# Patient Record
Sex: MALE | HISPANIC OR LATINO | Employment: FULL TIME | URBAN - METROPOLITAN AREA
[De-identification: names, ages, dates, MRNs, and addresses within clinical notes are randomized per-mention and may not be internally consistent; named-entity substitution may affect disease eponyms.]

---

## 2017-07-08 ENCOUNTER — OFFICE VISIT (OUTPATIENT)
Dept: URGENT CARE | Facility: PHYSICIAN GROUP | Age: 27
End: 2017-07-08
Payer: COMMERCIAL

## 2017-07-08 VITALS
DIASTOLIC BLOOD PRESSURE: 82 MMHG | HEIGHT: 68 IN | OXYGEN SATURATION: 98 % | TEMPERATURE: 98.1 F | BODY MASS INDEX: 35.77 KG/M2 | SYSTOLIC BLOOD PRESSURE: 130 MMHG | WEIGHT: 236 LBS | HEART RATE: 104 BPM | RESPIRATION RATE: 16 BRPM

## 2017-07-08 DIAGNOSIS — J00 ACUTE NASOPHARYNGITIS: ICD-10-CM

## 2017-07-08 PROCEDURE — 99213 OFFICE O/P EST LOW 20 MIN: CPT | Performed by: PHYSICIAN ASSISTANT

## 2017-07-08 RX ORDER — FLUTICASONE PROPIONATE 50 MCG
1 SPRAY, SUSPENSION (ML) NASAL 2 TIMES DAILY
Qty: 16 G | Refills: 0 | Status: SHIPPED | OUTPATIENT
Start: 2017-07-08

## 2017-07-08 NOTE — Clinical Note
July 8, 2017         Patient: Lasha Clancy Jr.   YOB: 1990   Date of Visit: 7/8/2017           To Whom it May Concern:    Lasha Clancy was seen in my clinic on 7/8/2017. Please excuse from missed today.     If you have any questions or concerns, please don't hesitate to call.        Sincerely,           Sera Quintero PA-C  Electronically Signed

## 2017-07-08 NOTE — MR AVS SNAPSHOT
"        Lasha Clancy JrAz   2017 12:20 PM   Office Visit   MRN: [id number]    Department:  [facility]   Dept Phone:  [phone]    Description:  Male : 1990   Provider:  Sera Quintero PA-C           Reason for Visit     Sinus Problem sinus pressure, fever, dizziness, buring sensation in [id number]      Allergies as of 2017     Allergen Noted Reactions    Pineapple 2015         You were diagnosed with     Acute nasopharyngitis   [[id number]]         Vital Signs     Blood Pressure Pulse Temperature Respirations Height Weight    130/82 mmHg 104 36.7 °C (98.1 °F) 16 1.727 m (5' 7.99\") 107.049 kg (236 lb)    Body Mass Index Oxygen Saturation Smoking Status             35.89 kg/m2 98% Former Smoker         Basic Information     Date Of Birth Sex Race Ethnicity Preferred Language    1990 Male  or   Origin (Turkish,Scottish,Gambian,Namibian, etc) English      Your appointments     Aug 11, 2017 10:50 AM   ANNUAL EXAM PREVENTATIVE with Lelia Moss D.O.   [facility])    [address], [address]  [place] [address] [phone]              Problem List              ICD-10-CM Priority Class Noted - Resolved    History of suicide attempt Z91.5   10/27/2014 - Present    Obesity E66.9   10/27/2014 - Present      Health Maintenance        Date Due Completion Dates    IMM HEP B VACCINE (1 of 3 - Primary Series) 1992 ---    IMM HEP A VACCINE (1 of 2 - Standard Series) 1993 ---    IMM VARICELLA (CHICKENPOX) VACCINE (1 of 2 - 2 Dose Adolescent Series) 2005 ---    IMM HPV VACCINE (2 of 3 - Male 3 Dose Series) 2014 10/27/2014    IMM INFLUENZA (1) 2017 10/27/2014    IMM DTaP/Tdap/Td Vaccine (2 - Td) 10/27/2024 10/27/2014            Current Immunizations     HPV Quadrivalent Vaccine (GARDASIL) 10/27/2014    Influenza Vaccine Quad Inj (Pf) 10/27/2014    Tdap Vaccine 10/27/2014      Below and/or attached are " the medications your provider expects you to take. Review all of your home medications and newly ordered medications with your provider and/or pharmacist. Follow medication instructions as directed by your provider and/or pharmacist. Please keep your medication list with you and share with your provider. Update the information when medications are discontinued, doses are changed, or new medications (including over-the-counter products) are added; and carry medication information at all times in the event of emergency situations     Allergies:  PINEAPPLE - (reactions not documented)               Medications  Valid as of: July 08, 2017 -  1:24 PM    Generic Name Brand Name Tablet Size Instructions for use    Fluticasone Propionate (Suspension) FLONASE 50 MCG/ACT Spray 1 Spray in nose 2 times a day.        .                 Medicines prescribed today were sent to:     [id number] - [facility] [place] - [address]    [address] [address]    Phone: [phone] Fax: [phone]    Open 24 Hours?: No      Medication refill instructions:       If your prescription bottle indicates you have medication refills left, it is not necessary to call your provider’s office. Please contact your pharmacy and they will refill your medication.    If your prescription bottle indicates you do not have any refills left, you may request refills at any time through one of the following ways: The online [employer] system (except Urgent Care), by calling your provider’s office, or by asking your pharmacy to contact your provider’s office with a refill request. Medication refills are processed only during regular business hours and may not be available until the next business day. Your provider may request additional information or to have a follow-up visit with you prior to refilling your medication.   *Please Note: Medication refills are assigned a new Rx number when refilled electronically. Your pharmacy may  indicate that no refills were authorized even though a new prescription for the same medication is available at the pharmacy. Please request the medicine by name with the pharmacy before contacting your provider for a refill.           [email or web address] Access Code: [email or web address]  Expires: 8/7/2017 12:20 PM    [email or web address]  A secure, online tool to manage your health information     [employer]’s [email or web address]com® is a secure, online tool that connects you to your personalized health information from the privacy of your home -- day or night - making it very easy for you to manage your healthcare. Once the activation process is completed, you can even access your medical information using the IntelligentE[employer]com dylan, which is available for free in the Apple Dylan store or Google Play store.     [email or web address]com provides the following levels of access (as shown below):   My Chart Features   [facility] Doctor [facility]  Specialists [facility]  Urgent  Care Non-[facility]  Primary Care  Doctor   Email your healthcare team securely and privately 24/7 X X X    Manage appointments: schedule your next appointment; view details of past/upcoming appointments X      Request prescription refills. X      View recent personal medical records, including lab and immunizations X X X X   View health record, including health history, allergies, medications X X X X   Read reports about your outpatient visits, procedures, consult and ER notes X X X X   See your discharge summary, which is a recap of your hospital and/or ER visit that includes your diagnosis, lab results, and care plan. X X       How to register for [email or web address]:  1. Go to  [email or web address].  2. Click on the Sign Up Now box, which takes you to the New Member Sign Up page. You will need to provide the following information:  a. Enter your [email or web address] Access Code exactly as it appears at the top of this page. (You will not need to use this code after you’ve completed the sign-up process. If you do not sign up  before the expiration date, you must request a new code.)   b. Enter your date of birth.   c. Enter your home email address.   d. Click Submit, and follow the next screen’s instructions.  3. Create a [employer] System[employer] ID. This will be your [employer] login ID and cannot be changed, so think of one that is secure and easy to remember.  4. Create a [employer] password. You can change your password at any time.  5. Enter your Password Reset Question and Answer. This can be used at a later time if you forget your password.   6. Enter your e-mail address. This allows you to receive e-mail notifications when new information is available in [employer].  7. Click Sign Up. You can now view your health information.    For assistance activating your [employer]ToHome account, call [phone]

## 2017-07-08 NOTE — PROGRESS NOTES
Chief Complaint   Patient presents with   • Sinus Problem     sinus pressure, fever, dizziness, buring sensation in [id number]       HISTORY OF PRESENT ILLNESS: Patient is a 27 y.o. male who presents today for 36 hours of feeling unwell.  He has been having sinus pressure, fullness in face, pressure and feeling off balance.   This morning he woke up with the sensation.    Last night had chills, body aches.  Some runny nose this morning.   Nothing coming out.  Sneezing but not coughing.    He was feeling hot earlier today but no confirmed fevers.    No coughing.   No current smoking.   He took some NyQuil last night and did not help him.    Had to leave work early today.     Patient Active Problem List    Diagnosis Date Noted   • History of suicide attempt 10/27/2014   • Obesity 10/27/2014       Allergies:Pineapple    Current Outpatient Prescriptions Ordered in [employer]   Medication Sig Dispense Refill   • fluticasone (FLONASE ALLERGY RELIEF) 50 MCG/ACT nasal spray Spray 1 Spray in nose 2 times a day. 16 g 0     No current Epic-ordered facility-administered medications on file.       History reviewed. No pertinent past medical history.    Social History   Substance Use Topics   • Smoking status: Former Smoker     Quit date: 10/27/2011   • Smokeless tobacco: Never Used   • Alcohol Use: Yes      Comment: occasionally       Family Status   Relation Status Death Age   • Mother Alive    • Father Alive      Family History   Problem Relation Age of Onset   • Diabetes Maternal Aunt    • Diabetes Paternal Grandfather    • Heart Disease Neg Hx    • Hyperlipidemia Neg Hx    • Cancer Neg Hx    • Hypertension Maternal Aunt        ROS:  Review of Systems   Constitutional: SEE HPI  HENT: SEE HPI  Eyes: Negative for blurred vision.   Respiratory: Negative for cough, sputum production, shortness of breath and wheezing.    Cardiovascular: Negative for chest pain, palpitations, orthopnea and leg swelling.   Gastrointestinal: Negative for  "heartburn, nausea, vomiting and abdominal pain.   All other systems reviewed and are negative.       Exam:  Blood pressure 130/82, pulse 104, temperature 36.7 °C (98.1 °F), resp. rate 16, height 1.727 m (5' 7.99\"), weight 107.049 kg (236 lb), SpO2 98 %.  General:  Well nourished, well developed male in NAD  Eyes: PERRLA, EOM within normal limits, no conjunctival injection, no scleral icterus, visual fields and acuity grossly intact.  Ears: Normal shape and symmetry, no tenderness, no discharge. External canals are without any significant edema or erythema. Tympanic membranes are without any inflammation, no effusion. Gross auditory acuity is intact  Nose: Symmetrical, sinuses without tenderness, clear rhinorrhea/boggy turbinates, left > right.     Mouth: reasonable hygiene, no erythema exudates or tonsillar enlargement.  Neck: no masses, range of motion within normal limits, no tracheal deviation. No lymphadenopathy  Pulmonary: Normal respiratory effort, no wheezes, crackles, or rhonchi.  Cardiovascular: regular rate and rhythm without murmurs, rubs, or gallops.  Skin: No visible rashes or lesion. Warm, pink, dry.   Extremities: no clubbing, cyanosis, or edema.  Neuro: A&O x 3. Speech normal/clear.  Normal gait.         Assessment/Plan:  1. Acute nasopharyngitis  fluticasone (FLONASE ALLERGY RELIEF) 50 MCG/ACT nasal spray       -discussed that I felt this was viral in nature. Did not see any evidence of a bacterial process. Discussed natural progression and sx care.  -fluids, rest emphasized.  Flonase/Allegra or similar for post nasal drainage trial.   -humidifier/steam inhalations.    -work note provided    Supportive care, differential diagnoses, and indications for immediate follow-up discussed with patient.   Pathogenesis of diagnosis discussed including typical length and natural progression.   Instructed to return to clinic or nearest emergency department for any change in condition, further concerns, or " worsening of symptoms.  Patient states understanding of the plan of care and discharge instructions.      Sera Quintero PA-C

## 2017-12-19 ENCOUNTER — OFFICE VISIT (OUTPATIENT)
Dept: URGENT CARE | Facility: PHYSICIAN GROUP | Age: 27
End: 2017-12-19
Payer: COMMERCIAL

## 2017-12-19 VITALS
TEMPERATURE: 99.4 F | SYSTOLIC BLOOD PRESSURE: 148 MMHG | RESPIRATION RATE: 16 BRPM | OXYGEN SATURATION: 94 % | BODY MASS INDEX: 36.65 KG/M2 | DIASTOLIC BLOOD PRESSURE: 78 MMHG | WEIGHT: 241 LBS | HEART RATE: 105 BPM

## 2017-12-19 DIAGNOSIS — J06.9 ACUTE URI: Primary | ICD-10-CM

## 2017-12-19 LAB
INT CON NEG: NEGATIVE
INT CON POS: POSITIVE
S PYO AG THROAT QL: NEGATIVE

## 2017-12-19 PROCEDURE — 87880 STREP A ASSAY W/OPTIC: CPT | Performed by: NURSE PRACTITIONER

## 2017-12-19 PROCEDURE — 99214 OFFICE O/P EST MOD 30 MIN: CPT | Performed by: NURSE PRACTITIONER

## 2017-12-19 RX ORDER — AZITHROMYCIN 250 MG/1
TABLET, FILM COATED ORAL
Qty: 6 TAB | Refills: 0 | Status: SHIPPED | OUTPATIENT
Start: 2017-12-19

## 2017-12-19 RX ORDER — ASCORBIC ACID 500 MG
500 TABLET ORAL DAILY
COMMUNITY

## 2017-12-19 ASSESSMENT — ENCOUNTER SYMPTOMS
NAUSEA: 0
TINGLING: 1
SPUTUM PRODUCTION: 1
RHINORRHEA: 1
SORE THROAT: 1
DIARRHEA: 0
WEAKNESS: 0
CHILLS: 0
SHORTNESS OF BREATH: 0
VOMITING: 0
MYALGIAS: 0
COUGH: 1
FEVER: 0

## 2017-12-19 NOTE — PROGRESS NOTES
Subjective:      Lasha Clancy Jr. is a 27 y.o. male who presents with Sinus Problem (tingly throat, sneezing, x 8 days sore throat yellow mucas x 2 days)            Medications, Allergies and Prior Medical Hx reviewed and updated in [facility].with patient/family today     Pt feels tingling in his face intermittently especially when going to sleep.       URI    This is a new problem. The current episode started in the past 7 days. The problem has been gradually worsening. There has been no fever. Associated symptoms include congestion, coughing, rhinorrhea, sneezing and a sore throat. Pertinent negatives include no diarrhea, ear pain, nausea or vomiting. He has tried decongestant for the symptoms. The treatment provided mild relief.       Review of Systems   Constitutional: Positive for malaise/fatigue. Negative for chills and fever.   HENT: Positive for congestion, rhinorrhea, sneezing and sore throat. Negative for ear discharge and ear pain.    Respiratory: Positive for cough and sputum production. Negative for shortness of breath.    Gastrointestinal: Negative for diarrhea, nausea and vomiting.   Musculoskeletal: Negative for myalgias.   Neurological: Positive for tingling. Negative for weakness.          Objective:     /78   Pulse (!) 105   Temp 37.4 °C (99.4 °F)   Resp 16   Wt 109.3 kg (241 lb)   SpO2 94%   BMI 36.65 kg/m²      Physical Exam   Constitutional: He is oriented to person, place, and time. He appears well-developed and well-nourished. No distress.   HENT:   Head: Normocephalic and atraumatic.   Right Ear: Tympanic membrane and ear canal normal.   Left Ear: Tympanic membrane and ear canal normal.   Nose: Rhinorrhea present.   Mouth/Throat: Uvula is midline and mucous membranes are normal. No trismus in the jaw. No uvula swelling. Posterior oropharyngeal edema and posterior oropharyngeal erythema present. No oropharyngeal exudate.   Eyes: Conjunctivae are normal. Pupils are equal, round, and  reactive to light.   Neck: Neck supple.   Cardiovascular: Normal rate, regular rhythm and normal heart sounds.    Pulmonary/Chest: Effort normal and breath sounds normal. No respiratory distress. He has no wheezes.   Lymphadenopathy:     He has cervical adenopathy.   Neurological: He is alert and oriented to person, place, and time.   No facial droop.    Skin: Skin is warm and dry. Capillary refill takes less than 2 seconds.   Psychiatric: He has a normal mood and affect. His behavior is normal.   Vitals reviewed.              Assessment/Plan:     1. Acute URI  POCT Rapid Strep A    azithromycin (ZITHROMAX) 250 MG Tab       poct strep - neg    rx written patient will hold until parameters met as dicussed with patient    Rest, Fluids, tylenol, ibuprofen,  humidified air, and otc decongestants  Pt will go to the ER for worsening or changing symptoms as discussed,   Follow-up with your primary care provider or return here if not improving in 5 - 7 days   Discharge instructions discussed with pt/family who verbalize understanding and agreement with poc

## 2024-05-01 ENCOUNTER — HOSPITAL ENCOUNTER (OUTPATIENT)
Dept: RADIOLOGY | Facility: MEDICAL CENTER | Age: 34
End: 2024-05-01
Attending: FAMILY MEDICINE

## 2024-05-01 ENCOUNTER — HOSPITAL ENCOUNTER (OUTPATIENT)
Dept: RADIOLOGY | Facility: MEDICAL CENTER | Age: 34
End: 2024-05-01